# Patient Record
Sex: MALE | Race: WHITE | URBAN - METROPOLITAN AREA
[De-identification: names, ages, dates, MRNs, and addresses within clinical notes are randomized per-mention and may not be internally consistent; named-entity substitution may affect disease eponyms.]

---

## 2020-04-23 ENCOUNTER — OFFICE VISIT (OUTPATIENT)
Dept: URBAN - METROPOLITAN AREA CLINIC 29 | Facility: CLINIC | Age: 75
End: 2020-04-23
Payer: MEDICARE

## 2020-04-23 DIAGNOSIS — H40.1123 PRIMARY OPEN-ANGLE GLAUCOMA, LEFT EYE, SEVERE STAGE: Primary | ICD-10-CM

## 2020-04-23 PROCEDURE — 76514 ECHO EXAM OF EYE THICKNESS: CPT | Performed by: OPHTHALMOLOGY

## 2020-04-23 PROCEDURE — 92020 GONIOSCOPY: CPT | Performed by: OPHTHALMOLOGY

## 2020-04-23 PROCEDURE — 92004 COMPRE OPH EXAM NEW PT 1/>: CPT | Performed by: OPHTHALMOLOGY

## 2020-04-23 PROCEDURE — 92133 CPTRZD OPH DX IMG PST SGM ON: CPT | Performed by: OPHTHALMOLOGY

## 2020-04-23 RX ORDER — ATORVASTATIN CALCIUM 40 MG/1
40 MG TABLET, FILM COATED ORAL
Qty: 0 | Refills: 0 | Status: INACTIVE
Start: 2020-04-23 | End: 2020-04-23

## 2020-04-23 RX ORDER — PREDNISOLONE ACETATE 10 MG/ML
1 % SUSPENSION/ DROPS OPHTHALMIC
Qty: 5 | Refills: 0 | Status: INACTIVE
Start: 2020-04-23 | End: 2020-06-25

## 2020-04-23 ASSESSMENT — INTRAOCULAR PRESSURE
OD: 17
OS: 22

## 2020-04-23 NOTE — IMPRESSION/PLAN
Impression: Primary open-angle glaucoma, left eye, severe stage: T48.5544. CCT thick OU Enlarged cupping OU IOP stable OD, elevated OS Target IOP in low teen OS Plan: Discussed diagnosis, explained and understood by patient. Discussed IOP/ONH/Glaucoma management and risks. OCT ordered, performed and reviewed. Start Travatan Z QHS OU and Alphagan BID OU. Discussed side effects of glaucoma meds. Will continue to monitor condition and symptoms. Recommend Trabeculoplasty (ALT) OS to possibly lower IOP. Patient elects ALT . Discussed RBA's of laser trabeculoplasty . RL=2. Start prednisolone qid x 7 days after laser procedure.

## 2020-06-19 ENCOUNTER — SURGERY (OUTPATIENT)
Dept: URBAN - METROPOLITAN AREA SURGERY 11 | Facility: SURGERY | Age: 75
End: 2020-06-19
Payer: MEDICARE

## 2020-06-19 PROCEDURE — 65855 TRABECULOPLASTY LASER SURG: CPT | Performed by: OPHTHALMOLOGY

## 2020-06-25 ENCOUNTER — POST-OPERATIVE VISIT (OUTPATIENT)
Dept: URBAN - METROPOLITAN AREA CLINIC 29 | Facility: CLINIC | Age: 75
End: 2020-06-25

## 2020-06-25 DIAGNOSIS — Z48.810 ENCNTR FOR SURGICAL AFTCR FOL SURGERY ON THE SENSE ORGANS: ICD-10-CM

## 2020-06-25 DIAGNOSIS — Z09 ENCNTR FOR F/U EXAM AFT TRTMT FOR COND OTH THAN MALIG NEOPLM: Primary | ICD-10-CM

## 2020-06-25 PROCEDURE — 99024 POSTOP FOLLOW-UP VISIT: CPT | Performed by: OPTOMETRIST

## 2020-06-25 RX ORDER — PREDNISOLONE ACETATE 10 MG/ML
1 % SUSPENSION/ DROPS OPHTHALMIC
Qty: 5 | Refills: 0 | Status: INACTIVE
Start: 2020-06-19 | End: 2020-06-26

## 2020-06-25 ASSESSMENT — INTRAOCULAR PRESSURE
OS: 19
OD: 20

## 2020-07-24 ENCOUNTER — OFFICE VISIT (OUTPATIENT)
Dept: URBAN - METROPOLITAN AREA CLINIC 29 | Facility: CLINIC | Age: 75
End: 2020-07-24
Payer: MEDICARE

## 2020-07-24 DIAGNOSIS — H40.1112 PRIMARY OPEN-ANGLE GLAUCOMA, RIGHT EYE, MODERATE STAGE: ICD-10-CM

## 2020-07-24 PROCEDURE — 99213 OFFICE O/P EST LOW 20 MIN: CPT | Performed by: OPHTHALMOLOGY

## 2020-07-24 RX ORDER — BRIMONIDINE TARTRATE 1 MG/ML
0.1 % SOLUTION/ DROPS OPHTHALMIC
Qty: 1 | Refills: 11 | Status: INACTIVE
Start: 2020-07-24 | End: 2021-01-05

## 2020-07-24 RX ORDER — TRAVOPROST 0.04 MG/ML
0.004 % SOLUTION/ DROPS OPHTHALMIC
Qty: 2.5 | Refills: 11 | Status: INACTIVE
Start: 2020-07-24 | End: 2020-12-10

## 2020-07-24 ASSESSMENT — INTRAOCULAR PRESSURE
OS: 18
OD: 18

## 2020-07-24 NOTE — IMPRESSION/PLAN
Impression: Primary open-angle glaucoma, right eye, moderate stage: H40.1112. OD.  Plan: see note #1

## 2020-07-24 NOTE — IMPRESSION/PLAN
Impression: Primary open-angle glaucoma, left eye, severe stage: D49.8819. OS. ALT OS 6/19/20 CCT thick OU Enlarged cupping OU IOP stable OU Target IOP in low teen OS (H40.1123). Plan: Discussed diagnosis, explained and understood by patient. Discussed IOP/ONH/Glaucoma management and risks. Continue Alphagan BID OU & Travatan Z QHS OS, renewed drops. Will continue to monitor condition and symptoms.

## 2020-11-03 ENCOUNTER — OFFICE VISIT (OUTPATIENT)
Dept: URBAN - METROPOLITAN AREA CLINIC 29 | Facility: CLINIC | Age: 75
End: 2020-11-03
Payer: MEDICARE

## 2020-11-03 PROCEDURE — 99213 OFFICE O/P EST LOW 20 MIN: CPT | Performed by: OPHTHALMOLOGY

## 2020-11-03 ASSESSMENT — INTRAOCULAR PRESSURE
OD: 17
OS: 17

## 2020-11-03 NOTE — IMPRESSION/PLAN
Impression: Primary open-angle glaucoma, left eye, severe stage: Q64.0590. OS. ALT OS 6/19/20 CCT thick OU Enlarged cupping OU IOP elevated OD, stable OS Target IOP in low teen OS Plan: Discussed diagnosis, explained and understood by patient. Discussed IOP/ONH/Glaucoma management and risks. Continue brimonidine 2% bid ou & latanoprost QHS OS, renewed drops. Will continue to monitor condition and symptoms. Recommend Trabeculoplasty (SLT) OD to possibly lower IOP. Patient elects SLT . Discussed RBA's of laser trabeculoplasty .  RL=2

## 2020-12-04 ENCOUNTER — SURGERY (OUTPATIENT)
Dept: URBAN - METROPOLITAN AREA SURGERY 11 | Facility: SURGERY | Age: 75
End: 2020-12-04
Payer: MEDICARE

## 2020-12-04 PROCEDURE — 65855 TRABECULOPLASTY LASER SURG: CPT | Performed by: OPHTHALMOLOGY

## 2020-12-10 ENCOUNTER — POST-OPERATIVE VISIT (OUTPATIENT)
Dept: URBAN - METROPOLITAN AREA CLINIC 23 | Facility: CLINIC | Age: 75
End: 2020-12-10

## 2020-12-10 PROCEDURE — 99024 POSTOP FOLLOW-UP VISIT: CPT | Performed by: OPHTHALMOLOGY

## 2020-12-10 RX ORDER — TRAVOPROST 0.04 MG/ML
0.004 % SOLUTION/ DROPS OPHTHALMIC
Qty: 2.5 | Refills: 11 | Status: INACTIVE
Start: 2020-12-10 | End: 2021-01-05

## 2020-12-10 ASSESSMENT — INTRAOCULAR PRESSURE
OD: 18
OS: 19

## 2020-12-10 NOTE — IMPRESSION/PLAN
Impression: S/P SLT (Selective Laser Trabeculoplasty) OS - 6 Days. Encounter for surgical aftercare following surgery on a sense organ  Z48.810.  Excellent post op course   Post operative instructions reviewed - Condition is improving - Plan: Continue Alphagan P BID OU and Travatan Z OU QHS

## 2021-01-05 ENCOUNTER — OFFICE VISIT (OUTPATIENT)
Dept: URBAN - METROPOLITAN AREA CLINIC 29 | Facility: CLINIC | Age: 76
End: 2021-01-05
Payer: MEDICARE

## 2021-01-05 PROCEDURE — 99213 OFFICE O/P EST LOW 20 MIN: CPT | Performed by: OPHTHALMOLOGY

## 2021-01-05 RX ORDER — BRIMONIDINE TARTRATE 2 MG/ML
0.2 % SOLUTION/ DROPS OPHTHALMIC
Qty: 5 | Refills: 11 | Status: ACTIVE
Start: 2021-01-05

## 2021-01-05 RX ORDER — TRAVOPROST 0.04 MG/ML
0.004 % SOLUTION/ DROPS OPHTHALMIC
Qty: 2.5 | Refills: 11 | Status: INACTIVE
Start: 2021-01-05 | End: 2021-01-05

## 2021-01-05 RX ORDER — TRAVOPROST OPHTHALMIC SOLUTION, 0.004% 0.04 MG/ML
0.004 % SOLUTION/ DROPS OPHTHALMIC
Qty: 2.5 | Refills: 11 | Status: ACTIVE
Start: 2021-01-05

## 2021-01-05 ASSESSMENT — INTRAOCULAR PRESSURE
OS: 14
OD: 16

## 2021-01-05 NOTE — IMPRESSION/PLAN
Impression: Primary open-angle glaucoma, left eye, severe stage: G30.5307. OS. ALT OS 6/19/20 SLT OD 12/4/20 CCT thick OU Enlarged cupping OU IOP stable OU Plan: Discussed diagnosis, explained and understood by patient. Discussed IOP/ONH/Glaucoma management and risks. Continue brimonidine 2% bid ou & travaprost QHS OU. Will continue to monitor condition and symptoms.